# Patient Record
Sex: MALE | Race: OTHER | HISPANIC OR LATINO
[De-identification: names, ages, dates, MRNs, and addresses within clinical notes are randomized per-mention and may not be internally consistent; named-entity substitution may affect disease eponyms.]

---

## 2018-08-27 PROBLEM — Z00.00 ENCOUNTER FOR PREVENTIVE HEALTH EXAMINATION: Status: ACTIVE | Noted: 2018-08-27

## 2018-09-17 ENCOUNTER — OTHER (OUTPATIENT)
Age: 33
End: 2018-09-17

## 2018-09-17 DIAGNOSIS — M25.569 PAIN IN UNSPECIFIED KNEE: ICD-10-CM

## 2018-09-25 ENCOUNTER — APPOINTMENT (OUTPATIENT)
Dept: ORTHOPEDIC SURGERY | Facility: CLINIC | Age: 33
End: 2018-09-25
Payer: COMMERCIAL

## 2018-09-25 VITALS
HEIGHT: 71 IN | SYSTOLIC BLOOD PRESSURE: 108 MMHG | DIASTOLIC BLOOD PRESSURE: 73 MMHG | HEART RATE: 60 BPM | BODY MASS INDEX: 31.5 KG/M2 | WEIGHT: 225 LBS

## 2018-09-25 DIAGNOSIS — S83.249A OTHER TEAR OF MEDIAL MENISCUS, CURRENT INJURY, UNSPECIFIED KNEE, INITIAL ENCOUNTER: ICD-10-CM

## 2018-09-25 PROCEDURE — 99204 OFFICE O/P NEW MOD 45 MIN: CPT

## 2018-09-25 PROCEDURE — 73564 X-RAY EXAM KNEE 4 OR MORE: CPT | Mod: LT

## 2018-10-16 ENCOUNTER — OUTPATIENT (OUTPATIENT)
Dept: OUTPATIENT SERVICES | Facility: HOSPITAL | Age: 33
LOS: 1 days | End: 2018-10-16
Payer: COMMERCIAL

## 2018-10-16 VITALS
SYSTOLIC BLOOD PRESSURE: 121 MMHG | HEART RATE: 67 BPM | TEMPERATURE: 97 F | WEIGHT: 229.28 LBS | HEIGHT: 71 IN | RESPIRATION RATE: 14 BRPM | DIASTOLIC BLOOD PRESSURE: 79 MMHG

## 2018-10-16 DIAGNOSIS — S83.242A OTHER TEAR OF MEDIAL MENISCUS, CURRENT INJURY, LEFT KNEE, INITIAL ENCOUNTER: ICD-10-CM

## 2018-10-16 DIAGNOSIS — Z29.9 ENCOUNTER FOR PROPHYLACTIC MEASURES, UNSPECIFIED: ICD-10-CM

## 2018-10-16 DIAGNOSIS — R00.2 PALPITATIONS: ICD-10-CM

## 2018-10-16 DIAGNOSIS — K38.9 DISEASE OF APPENDIX, UNSPECIFIED: Chronic | ICD-10-CM

## 2018-10-16 DIAGNOSIS — Z01.818 ENCOUNTER FOR OTHER PREPROCEDURAL EXAMINATION: ICD-10-CM

## 2018-10-16 LAB
ANION GAP SERPL CALC-SCNC: 14 MMOL/L — SIGNIFICANT CHANGE UP (ref 5–17)
APTT BLD: 29.2 SEC — SIGNIFICANT CHANGE UP (ref 27.5–37.4)
BUN SERPL-MCNC: 12 MG/DL — SIGNIFICANT CHANGE UP (ref 8–20)
CALCIUM SERPL-MCNC: 9.5 MG/DL — SIGNIFICANT CHANGE UP (ref 8.6–10.2)
CHLORIDE SERPL-SCNC: 103 MMOL/L — SIGNIFICANT CHANGE UP (ref 98–107)
CO2 SERPL-SCNC: 23 MMOL/L — SIGNIFICANT CHANGE UP (ref 22–29)
CREAT SERPL-MCNC: 0.69 MG/DL — SIGNIFICANT CHANGE UP (ref 0.5–1.3)
EOSINOPHIL # BLD AUTO: 0.2 K/UL — SIGNIFICANT CHANGE UP (ref 0–0.5)
EOSINOPHIL NFR BLD AUTO: 4.3 % — SIGNIFICANT CHANGE UP (ref 0–5)
GLUCOSE SERPL-MCNC: 80 MG/DL — SIGNIFICANT CHANGE UP (ref 70–115)
HCT VFR BLD CALC: 48.5 % — SIGNIFICANT CHANGE UP (ref 42–52)
HGB BLD-MCNC: 16.6 G/DL — SIGNIFICANT CHANGE UP (ref 14–18)
INR BLD: 0.91 RATIO — SIGNIFICANT CHANGE UP (ref 0.88–1.16)
LYMPHOCYTES # BLD AUTO: 2.1 K/UL — SIGNIFICANT CHANGE UP (ref 1–4.8)
LYMPHOCYTES # BLD AUTO: 38.5 % — SIGNIFICANT CHANGE UP (ref 20–55)
MCHC RBC-ENTMCNC: 29.3 PG — SIGNIFICANT CHANGE UP (ref 27–31)
MCHC RBC-ENTMCNC: 34.2 G/DL — SIGNIFICANT CHANGE UP (ref 32–36)
MCV RBC AUTO: 85.5 FL — SIGNIFICANT CHANGE UP (ref 80–94)
MONOCYTES # BLD AUTO: 0.4 K/UL — SIGNIFICANT CHANGE UP (ref 0–0.8)
MONOCYTES NFR BLD AUTO: 7.3 % — SIGNIFICANT CHANGE UP (ref 3–10)
NEUTROPHILS # BLD AUTO: 2.7 K/UL — SIGNIFICANT CHANGE UP (ref 1.8–8)
NEUTROPHILS NFR BLD AUTO: 49.5 % — SIGNIFICANT CHANGE UP (ref 37–73)
PLATELET # BLD AUTO: 176 K/UL — SIGNIFICANT CHANGE UP (ref 150–400)
POTASSIUM SERPL-MCNC: 4 MMOL/L — SIGNIFICANT CHANGE UP (ref 3.5–5.3)
POTASSIUM SERPL-SCNC: 4 MMOL/L — SIGNIFICANT CHANGE UP (ref 3.5–5.3)
PROTHROM AB SERPL-ACNC: 10 SEC — SIGNIFICANT CHANGE UP (ref 9.8–12.7)
RBC # BLD: 5.67 M/UL — SIGNIFICANT CHANGE UP (ref 4.6–6.2)
RBC # FLD: 12.8 % — SIGNIFICANT CHANGE UP (ref 11–15.6)
SODIUM SERPL-SCNC: 140 MMOL/L — SIGNIFICANT CHANGE UP (ref 135–145)
WBC # BLD: 5.4 K/UL — SIGNIFICANT CHANGE UP (ref 4.8–10.8)
WBC # FLD AUTO: 5.4 K/UL — SIGNIFICANT CHANGE UP (ref 4.8–10.8)

## 2018-10-16 PROCEDURE — 93005 ELECTROCARDIOGRAM TRACING: CPT

## 2018-10-16 PROCEDURE — 85610 PROTHROMBIN TIME: CPT

## 2018-10-16 PROCEDURE — 36415 COLL VENOUS BLD VENIPUNCTURE: CPT

## 2018-10-16 PROCEDURE — 93010 ELECTROCARDIOGRAM REPORT: CPT

## 2018-10-16 PROCEDURE — 80048 BASIC METABOLIC PNL TOTAL CA: CPT

## 2018-10-16 PROCEDURE — G0463: CPT

## 2018-10-16 PROCEDURE — 85730 THROMBOPLASTIN TIME PARTIAL: CPT

## 2018-10-16 PROCEDURE — 85027 COMPLETE CBC AUTOMATED: CPT

## 2018-10-16 RX ORDER — SODIUM CHLORIDE 9 MG/ML
3 INJECTION INTRAMUSCULAR; INTRAVENOUS; SUBCUTANEOUS ONCE
Qty: 0 | Refills: 0 | Status: DISCONTINUED | OUTPATIENT
Start: 2018-10-24 | End: 2018-10-24

## 2018-10-16 NOTE — H&P PST ADULT - HISTORY OF PRESENT ILLNESS
34 y/o male seen today at [facility] for pre-op left knee arthroscopy, partial medial menisectomy, possible parameniscal excision due to worsening symptoms of pain. Pt denies any recent  trauma to left knee, states he was very involve in sport in the past. Pt endorsed episode of palpitation six months ago during physical exercise, seen by his PCP and cardiologist, had a cardiac workup done and currently not on any medication.

## 2018-10-16 NOTE — H&P PST ADULT - NSANTHOSAYNRD_GEN_A_CORE
No. BROCK screening performed.  STOP BANG Legend: 0-2 = LOW Risk; 3-4 = INTERMEDIATE Risk; 5-8 = HIGH Risk

## 2018-10-16 NOTE — ASU PATIENT PROFILE, ADULT - LEARNING ASSESSMENT (PATIENT) ADDITIONAL COMMENTS
Instructed pt on pre-op instructions, tips for safer surgery, pain management scale, pre-surgical infection prevention instructions, medical clearance - Dr Ladinsky, verbalized understanding of all instructions given.

## 2018-10-16 NOTE — H&P PST ADULT - MUSCULOSKELETAL
negative No joint pain, swelling or deformity; no limitation of movement details… detailed exam diminished strength

## 2018-10-16 NOTE — H&P PST ADULT - PMH
High blood pressure  2016 High blood pressure  2016. Currently not on any b/p medication  Palpitation

## 2018-10-16 NOTE — H&P PST ADULT - ASSESSMENT
34 y/o male seen today for pre-op left knee arthroscopy, partial medial meniscectomy, possible parameniscal excision. Surgical protocol reviewed with pt today, pt to follow -up with PCP for medical clearance   CAPRINI SCORE [CLOT]    AGE RELATED RISK FACTORS                                                       MOBILITY RELATED FACTORS  [ ] Age 41-60 years                                            (1 Point)                  [ ] Bed rest                                                        (1 Point)  [ ] Age: 61-74 years                                           (2 Points)                 [ ] Plaster cast                                                   (2 Points)  [ ] Age= 75 years                                              (3 Points)                 [ ] Bed bound for more than 72 hours                 (2 Points)    DISEASE RELATED RISK FACTORS                                               GENDER SPECIFIC FACTORS  [ ] Edema in the lower extremities                       (1 Point)                  [ ] Pregnancy                                                     (1 Point)  [ ] Varicose veins                                               (1 Point)                  [ ] Post-partum < 6 weeks                                   (1 Point)             [x ] BMI > 25 Kg/m2                                            (1 Point)                  [ ] Hormonal therapy  or oral contraception          (1 Point)                 [ ] Sepsis (in the previous month)                        (1 Point)                  [ ] History of pregnancy complications                 (1 point)  [ ] Pneumonia or serious lung disease                                               [ ] Unexplained or recurrent                     (1 Point)           (in the previous month)                               (1 Point)  [ ] Abnormal pulmonary function test                     (1 Point)                 SURGERY RELATED RISK FACTORS  [ ] Acute myocardial infarction                              (1 Point)                 [ ]  Section                                             (1 Point)  [ ] Congestive heart failure (in the previous month)  (1 Point)               [ ] Minor surgery                                                  (1 Point)   [ ] Inflammatory bowel disease                             (1 Point)                 [ ] Arthroscopic surgery                                        (2 Points)  [ ] Central venous access                                      (2 Points)                [ x] General surgery lasting more than 45 minutes   (2 Points)       [ ] Stroke (in the previous month)                          (5 Points)               [ ] Elective arthroplasty                                         (5 Points)                                                                                                                                               HEMATOLOGY RELATED FACTORS                                                 TRAUMA RELATED RISK FACTORS  [ ] Prior episodes of VTE                                     (3 Points)                [ ] Fracture of the hip, pelvis, or leg                       (5 Points)  [ ] Positive family history for VTE                         (3 Points)                 [ ] Acute spinal cord injury (in the previous month)  (5 Points)  [ ] Prothrombin 39645 A                                     (3 Points)                 [ ] Paralysis  (less than 1 month)                             (5 Points)  [ ] Factor V Leiden                                             (3 Points)                  [ ] Multiple Trauma within 1 month                        (5 Points)  [ ] Lupus anticoagulants                                     (3 Points)                                                           [ ] Anticardiolipin antibodies                               (3 Points)                                                       [ ] High homocysteine in the blood                      (3 Points)                                             [ ] Other congenital or acquired thrombophilia      (3 Points)                                                [ ] Heparin induced thrombocytopenia                  (3 Points)                                          Total Score [      3    ]

## 2018-10-17 PROBLEM — I10 ESSENTIAL (PRIMARY) HYPERTENSION: Chronic | Status: ACTIVE | Noted: 2018-10-16

## 2018-10-18 RX ORDER — CEFAZOLIN SODIUM 1 G
2000 VIAL (EA) INJECTION ONCE
Qty: 0 | Refills: 0 | Status: DISCONTINUED | OUTPATIENT
Start: 2018-10-24 | End: 2018-10-24

## 2018-10-23 ENCOUNTER — TRANSCRIPTION ENCOUNTER (OUTPATIENT)
Age: 33
End: 2018-10-23

## 2018-10-24 ENCOUNTER — OUTPATIENT (OUTPATIENT)
Dept: OUTPATIENT SERVICES | Facility: HOSPITAL | Age: 33
LOS: 1 days | End: 2018-10-24
Payer: COMMERCIAL

## 2018-10-24 ENCOUNTER — APPOINTMENT (OUTPATIENT)
Dept: ORTHOPEDIC SURGERY | Facility: HOSPITAL | Age: 33
End: 2018-10-24

## 2018-10-24 VITALS
SYSTOLIC BLOOD PRESSURE: 111 MMHG | RESPIRATION RATE: 16 BRPM | OXYGEN SATURATION: 100 % | HEART RATE: 58 BPM | DIASTOLIC BLOOD PRESSURE: 74 MMHG | WEIGHT: 229.28 LBS | TEMPERATURE: 98 F | HEIGHT: 71 IN

## 2018-10-24 VITALS
RESPIRATION RATE: 16 BRPM | TEMPERATURE: 97 F | SYSTOLIC BLOOD PRESSURE: 115 MMHG | DIASTOLIC BLOOD PRESSURE: 64 MMHG | HEART RATE: 62 BPM

## 2018-10-24 DIAGNOSIS — K38.9 DISEASE OF APPENDIX, UNSPECIFIED: Chronic | ICD-10-CM

## 2018-10-24 DIAGNOSIS — M23.051: ICD-10-CM

## 2018-10-24 DIAGNOSIS — S83.242A OTHER TEAR OF MEDIAL MENISCUS, CURRENT INJURY, LEFT KNEE, INITIAL ENCOUNTER: ICD-10-CM

## 2018-10-24 PROCEDURE — 29881 ARTHRS KNE SRG MNISECTMY M/L: CPT | Mod: LT

## 2018-10-24 PROCEDURE — ZZZZZ: CPT

## 2018-10-24 RX ORDER — ONDANSETRON 8 MG/1
4 TABLET, FILM COATED ORAL ONCE
Qty: 0 | Refills: 0 | Status: DISCONTINUED | OUTPATIENT
Start: 2018-10-24 | End: 2018-10-24

## 2018-10-24 RX ORDER — OXYCODONE AND ACETAMINOPHEN 5; 325 MG/1; MG/1
2 TABLET ORAL EVERY 4 HOURS
Qty: 0 | Refills: 0 | Status: DISCONTINUED | OUTPATIENT
Start: 2018-10-24 | End: 2018-10-24

## 2018-10-24 RX ORDER — FENTANYL CITRATE 50 UG/ML
50 INJECTION INTRAVENOUS
Qty: 0 | Refills: 0 | Status: DISCONTINUED | OUTPATIENT
Start: 2018-10-24 | End: 2018-10-24

## 2018-10-24 RX ORDER — SODIUM CHLORIDE 9 MG/ML
1000 INJECTION, SOLUTION INTRAVENOUS
Qty: 0 | Refills: 0 | Status: DISCONTINUED | OUTPATIENT
Start: 2018-10-24 | End: 2018-10-24

## 2018-10-24 RX ORDER — OXYCODONE AND ACETAMINOPHEN 5; 325 MG/1; MG/1
1 TABLET ORAL EVERY 4 HOURS
Qty: 0 | Refills: 0 | Status: DISCONTINUED | OUTPATIENT
Start: 2018-10-24 | End: 2018-10-24

## 2018-10-24 NOTE — ASU DISCHARGE PLAN (ADULT/PEDIATRIC). - MEDICATION SUMMARY - MEDICATIONS TO TAKE
I will START or STAY ON the medications listed below when I get home from the hospital:    Norco 5 mg-325 mg oral tablet  -- 1-2 tab(s) by mouth every 4 to 6 hours MDD:8  -- Caution federal law prohibits the transfer of this drug to any person other  than the person for whom it was prescribed.  May cause drowsiness.  Alcohol may intensify this effect.  Use care when operating dangerous machinery.  This product contains acetaminophen.  Do not use  with any other product containing acetaminophen to prevent possible liver damage.  Using more of this medication than prescribed may cause serious breathing problems.    -- Indication: For pain

## 2018-10-24 NOTE — ASU DISCHARGE PLAN (ADULT/PEDIATRIC). - NOTIFY
Swelling that continues/Fever greater than 101/Numbness, color, or temperature change to extremity/Bleeding that does not stop

## 2018-10-24 NOTE — BRIEF OPERATIVE NOTE - PROCEDURE
<<-----Click on this checkbox to enter Procedure Arthroscopy of knee with debridement and meniscectomy  10/24/2018  Left knee arthroscopic partial medial meniscectomy  Active  NICO

## 2018-11-12 ENCOUNTER — APPOINTMENT (OUTPATIENT)
Dept: ORTHOPEDIC SURGERY | Facility: CLINIC | Age: 33
End: 2018-11-12

## 2018-11-16 PROBLEM — R00.2 PALPITATIONS: Chronic | Status: ACTIVE | Noted: 2018-10-16

## 2018-11-20 ENCOUNTER — APPOINTMENT (OUTPATIENT)
Dept: ORTHOPEDIC SURGERY | Facility: CLINIC | Age: 33
End: 2018-11-20
Payer: COMMERCIAL

## 2018-11-20 VITALS
TEMPERATURE: 98.1 F | WEIGHT: 225 LBS | HEIGHT: 71 IN | SYSTOLIC BLOOD PRESSURE: 128 MMHG | HEART RATE: 59 BPM | DIASTOLIC BLOOD PRESSURE: 86 MMHG | BODY MASS INDEX: 31.5 KG/M2

## 2018-11-20 DIAGNOSIS — Z98.890 OTHER SPECIFIED POSTPROCEDURAL STATES: ICD-10-CM

## 2018-11-20 PROCEDURE — 99024 POSTOP FOLLOW-UP VISIT: CPT

## 2018-12-07 ENCOUNTER — APPOINTMENT (OUTPATIENT)
Dept: ORTHOPEDIC SURGERY | Facility: CLINIC | Age: 33
End: 2018-12-07

## 2023-09-07 NOTE — PHYSICAL THERAPY INITIAL EVALUATION ADULT - EDEMA NONPITTING
Jordi Goldberg is calling to request a refill on the following medication(s):    Medication Request:  Requested Prescriptions     Pending Prescriptions Disp Refills    hydrOXYzine HCl (ATARAX) 10 MG tablet 30 tablet 0     Sig: Take 1 tablet by mouth 3 times daily as needed for Anxiety       Last Visit Date (If Applicable):  3/71/7850    Next Visit Date:    10/19/2023 right knee

## 2023-10-03 NOTE — ASU PATIENT PROFILE, ADULT - PAIN DESCRIPTION (FREQUENCY/QUALITY), PROFILE
Cervical spine CT reviewed which demonstrated no acute fracture or traumatic subluxation. Pt denies any neck pain, and there is no midline cervical spine tenderness upon palpation or with full ROM. Cervical collar cleared by confrontational exam and removed. occasional/tightness

## 2024-03-12 ENCOUNTER — NON-APPOINTMENT (OUTPATIENT)
Age: 39
End: 2024-03-12